# Patient Record
Sex: MALE | Race: WHITE | Employment: FULL TIME | URBAN - METROPOLITAN AREA
[De-identification: names, ages, dates, MRNs, and addresses within clinical notes are randomized per-mention and may not be internally consistent; named-entity substitution may affect disease eponyms.]

---

## 2018-04-06 ENCOUNTER — HOSPITAL ENCOUNTER (OUTPATIENT)
Dept: CT IMAGING | Facility: HOSPITAL | Age: 53
Discharge: HOME | End: 2018-04-06
Attending: FAMILY MEDICINE

## 2018-04-06 DIAGNOSIS — Z13.9 SCREENING FOR CONDITION: ICD-10-CM

## 2018-04-06 NOTE — PROGRESS NOTES
Pt seen at [facility], [facility]:  PRELIMINARY SCORE=19.66  QX=656/87    Cholestec labs as follows:  MP=140  HDL=59  LDL=94  KE=279  GLUCOSE=84; non-fasting    All results and risk factors discussed with patient; all questions and concerns addressed.   Myra Maddox